# Patient Record
Sex: FEMALE | Race: WHITE
[De-identification: names, ages, dates, MRNs, and addresses within clinical notes are randomized per-mention and may not be internally consistent; named-entity substitution may affect disease eponyms.]

---

## 2017-09-18 ENCOUNTER — HOSPITAL ENCOUNTER (EMERGENCY)
Dept: HOSPITAL 10 - FTE | Age: 22
Discharge: HOME | End: 2017-09-18
Payer: MEDICAID

## 2017-09-18 VITALS — WEIGHT: 112.44 LBS | BODY MASS INDEX: 26.02 KG/M2 | HEIGHT: 55 IN

## 2017-09-18 DIAGNOSIS — H00.014: Primary | ICD-10-CM

## 2017-09-18 DIAGNOSIS — Z87.891: ICD-10-CM

## 2017-09-18 PROCEDURE — 99283 EMERGENCY DEPT VISIT LOW MDM: CPT

## 2017-09-18 NOTE — ERD
ER Documentation


Chief Complaint


Date/Time


DATE: 9/18/17 


TIME: 11:06


Chief Complaint


left eye redness





HPI


22-year-old female presents to the emergency department complaining of a lesion 

on her left upper eyelid that is causing her discomfort.  She denies any 

significant tenderness.  Denies any discharge, vision changes.





ROS


All systems reviewed and are negative except as per history of present illness.





Medications


Home Meds


Active Scripts


Erythromycin (Erythromycin Opth) 3.5 Gm Oint..gm., 1 APPLIC LEFT EYE QID, #1


   Prov:BONIFACIO BAÑUELOS PA-C         9/18/17


Reported Medications


[Embril]   No Conflict Check


   12/11/13


[Embril]   No Conflict Check


   5/5/13





Allergies


Allergies:  


Coded Allergies:  


     No Known Drug Allergy (Verified  Allergy, Mild, 9/18/17)





PMhx/Soc


History of Surgery:  Yes (HERNIA)


Anesthesia Reaction:  No


Hx Neurological Disorder:  No


Hx Respiratory Disorders:  No


Hx Cardiac Disorders:  No


Hx Psychiatric Problems:  No


Hx Miscellaneous Medical Probl:  Yes (ARTHRITIS)


Hx Alcohol Use:  No


Hx Substance Use:  No


Hx Tobacco Use:  Yes (MEDICAL MARIJUANA)


Smoking Status:  Never smoker





Physical Exam


Vitals





Vital Signs








  Date Time  Temp Pulse Resp B/P Pulse Ox O2 Delivery O2 Flow Rate FiO2


 


9/18/17 08:46 98.3 80 18 106/56 97   








Physical Exam


Const:  []


Head:   Atraumatic 


Eyes:    Papule on left upper eye lid


ENT:    Normal External Ears, Nose and Mouth.


Neck:               Full range of motion..~ No meningismus.


Resp:    Clear to auscultation bilaterally


Cardio:    Regular rate and rhythm, no murmurs


Abd:    Soft, non tender, non distended. Normal bowel sounds


Skin:    No petechiae or rashes


Back:    No midline or flank tenderness


Ext:    No cyanosis, or edema


Neur:    Awake and alert


Psych:    Normal Mood and Affect





Procedures/MDM


22-year-old female presents with stye versus chalazion.  Patient stable to be 

discharged home with prescription for erythromycin ointment and warm 

compresses.  Discussed the follow-up with ophthalmologist.  She understands and 

agrees with plan





Departure


Diagnosis:  


 Primary Impression:  


 Stye


Condition:  Stable


Patient Instructions:  Candi France


Referrals:  


[facility]


Hours: Mon - Fri


9:00 AM - 5:00 PM





Additional Instructions:  


FOLLOW UP WITH YOUR PRIMARY CARE PHYSICIAN TOMORROW.Return to this facility if 

you are not improving as expected.











BONIFACIO BAÑUELOS PA-C Sep 18, 2017 11:07

## 2017-10-20 ENCOUNTER — HOSPITAL ENCOUNTER (EMERGENCY)
Dept: HOSPITAL 10 - FTE | Age: 22
Discharge: HOME | End: 2017-10-20
Payer: MEDICAID

## 2017-10-20 VITALS — HEART RATE: 69 BPM | SYSTOLIC BLOOD PRESSURE: 109 MMHG | RESPIRATION RATE: 20 BRPM | DIASTOLIC BLOOD PRESSURE: 65 MMHG

## 2017-10-20 VITALS
WEIGHT: 113.54 LBS | BODY MASS INDEX: 22.29 KG/M2 | HEIGHT: 60 IN | WEIGHT: 113.54 LBS | BODY MASS INDEX: 22.29 KG/M2 | HEIGHT: 60 IN

## 2017-10-20 DIAGNOSIS — J40: Primary | ICD-10-CM

## 2017-10-20 DIAGNOSIS — F17.210: ICD-10-CM

## 2017-10-20 PROCEDURE — 99284 EMERGENCY DEPT VISIT MOD MDM: CPT

## 2017-10-20 NOTE — ERD
ER Documentation


Chief Complaint


Chief Complaint


Complains of cough, colds and flu symptoms





HPI


This is a 22-year-old female presents to the ER with a cough over the last 5 

days.  Cough is worse at night and is sometimes with green sputum, and other 

times with clear sputum.  Patient is developing a sore throat however denies 

any ear pain.  She denies any fevers or chills.  She denies any chest pain or 

shortness of breath.  There are no sick contacts at home.  Patient has not 

traveled anywhere.





ROS


12 point review of systems was done, all negative except per HPI.





Medications


Home Meds


Active Scripts


Albuterol Sulfate* (Proair HFA*) 8.5 Gm Hfa.aer.ad, 2 PUFF INH Q4, #1 INHALER


   Prov:CRISTINA ALVARES         10/20/17


Guaifenesin* (Robitussin*) 100 Mg/5 Ml Syrup, 200 MG PO Q4H Y for COUGH for 5 

Days, ML


   Prov:CRISTINA ALVARES         10/20/17


Methylprednisolone* (Medrol* DOSE PACK) 4 Mg/Dose-Pack Tab.ds.pk, 4 MG PO .AS 

DIRECTED for 6 Days, PACKET


   Prov:CRISTINA ALVARES         10/20/17


Erythromycin (Erythromycin Opth) 3.5 Gm Oint..gm., 1 APPLIC LEFT EYE QID, #1


   Prov:BONIFACIO BAÑUELOS PA-C         9/18/17


Reported Medications


[Embril]   No Conflict Check


   12/11/13


[Embril]   No Conflict Check


   5/5/13





Allergies


Allergies:  


Coded Allergies:  


     No Known Drug Allergy (Verified  Allergy, Mild, 10/20/17)





PMhx/Soc


History of Surgery:  Yes (HERNIA)


Anesthesia Reaction:  No


Hx Neurological Disorder:  No


Hx Respiratory Disorders:  No


Hx Cardiac Disorders:  No


Hx Psychiatric Problems:  No


Hx Miscellaneous Medical Probl:  Yes (ARTHRITIS)


Hx Alcohol Use:  No


Hx Substance Use:  No


Hx Tobacco Use:  Yes (MEDICAL MARIJUANA)


Smoking Status:  Current every day smoker





Physical Exam


Vitals





Vital Signs








  Date Time  Temp Pulse Resp B/P Pulse Ox O2 Delivery O2 Flow Rate FiO2


 


10/20/17 07:34 97.7 65 20 110/65 98   








Physical Exam


GENERAL: The patient is well-developed, well-nourished, in no acute distress.


NECK: Cervical spine is non tender with no step off. Supple, no nuchal rigidity


HEENT: Atraumatic. Pupils equal, round and reactive to light. Extraocular 

muscles are grossly intact. Conjunctivae pink, no discharge. Bilateral tympanic 

membranes are clear with no evidence of erythema, effusion or dulling of the 

light reflex. Tonsilar erythema with no exudates or uvular deviation. Clear 

rhinorrhea. 


RESPIRATORY: Clear to auscultation bilaterally. There are no rales, wheezes or 

rhonchi. 


HEART: Regular rate and rhythm. No murmurs, clicks, rubs or gallops.


EXTREMITIES: No clubbing or cyanosis. Full range of motion. Grossly 

neurovascularly intact.


NEUROLOGIC: Alert and oriented. Cranial nerves II through XII are intact.


SKIN: There is no rash. The skin is warm and dry.





Procedures/MDM


Differential diagnosis includes but is not limited to; Viral URI, allergic 

rhinitis, bronchitis, pertussis,pneumonia. This is likely viral in etiology, 

likely bronchitis. Clinical suspicion for pneumonia is low as patient appears 

well, is not hypoxic or in any respiratory distress. Additionally, patients 

physical examination is benign. Plan was discussed with patient they understand 

and agree. Patient needs to follow up with PCP in 1-2 days or return to ER 

sooner if symptoms worsen.





Departure


Diagnosis:  


 Primary Impression:  


 Bronchitis


Condition:  Stable


Patient Instructions:  Bronchitis With Wheezing (Adult)





Additional Instructions:  


Call your primary care doctor TOMORROW for an appointment during the next 1-2 

days.See the doctor sooner or return here if your condition worsens before your 

appointment time.











CRISTINA ALVARES Oct 20, 2017 08:11

## 2018-03-15 ENCOUNTER — HOSPITAL ENCOUNTER (EMERGENCY)
Dept: HOSPITAL 91 - FTE | Age: 23
Discharge: HOME | End: 2018-03-15
Payer: MEDICAID

## 2018-03-15 ENCOUNTER — HOSPITAL ENCOUNTER (EMERGENCY)
Age: 23
Discharge: HOME | End: 2018-03-15

## 2018-03-15 DIAGNOSIS — Z87.891: ICD-10-CM

## 2018-03-15 DIAGNOSIS — M08.00: Primary | ICD-10-CM

## 2018-03-15 PROCEDURE — 99283 EMERGENCY DEPT VISIT LOW MDM: CPT

## 2018-03-15 RX ADMIN — HYDROCODONE BITARTRATE AND ACETAMINOPHEN 1 TAB: 5; 325 TABLET ORAL at 07:31

## 2018-03-15 RX ADMIN — ONDANSETRON 1 MG: 4 TABLET, ORALLY DISINTEGRATING ORAL at 07:31
